# Patient Record
Sex: FEMALE | Race: WHITE | NOT HISPANIC OR LATINO | Employment: OTHER | URBAN - METROPOLITAN AREA
[De-identification: names, ages, dates, MRNs, and addresses within clinical notes are randomized per-mention and may not be internally consistent; named-entity substitution may affect disease eponyms.]

---

## 2018-11-29 ENCOUNTER — IMPORTED ENCOUNTER (OUTPATIENT)
Dept: URBAN - METROPOLITAN AREA CLINIC 43 | Facility: CLINIC | Age: 83
End: 2018-11-29

## 2018-11-29 PROBLEM — H16.223: Noted: 2018-11-29

## 2018-11-29 PROBLEM — H25.12: Noted: 2018-11-29

## 2018-11-29 PROBLEM — H43.813: Noted: 2018-11-29

## 2018-11-29 PROBLEM — Z96.1: Noted: 2018-11-29

## 2018-11-29 PROBLEM — H35.033: Noted: 2018-11-29

## 2018-11-29 PROBLEM — H40.012: Noted: 2018-11-29

## 2018-11-29 PROBLEM — H25.012: Noted: 2018-11-29

## 2018-12-28 ENCOUNTER — IMPORTED ENCOUNTER (OUTPATIENT)
Dept: URBAN - METROPOLITAN AREA CLINIC 43 | Facility: CLINIC | Age: 83
End: 2018-12-28

## 2018-12-28 PROBLEM — H40.012: Noted: 2018-12-28

## 2018-12-28 PROBLEM — H01.00A: Noted: 2018-12-28

## 2018-12-28 PROBLEM — H01.00B: Noted: 2018-12-28

## 2018-12-28 PROBLEM — H16.223: Noted: 2018-12-28

## 2019-01-03 ENCOUNTER — IMPORTED ENCOUNTER (OUTPATIENT)
Dept: URBAN - METROPOLITAN AREA CLINIC 43 | Facility: CLINIC | Age: 84
End: 2019-01-03

## 2019-02-12 ENCOUNTER — IMPORTED ENCOUNTER (OUTPATIENT)
Dept: URBAN - METROPOLITAN AREA CLINIC 43 | Facility: CLINIC | Age: 84
End: 2019-02-12

## 2019-02-12 PROBLEM — H16.223: Noted: 2019-02-12

## 2019-02-12 PROBLEM — H40.012: Noted: 2019-02-12

## 2019-02-12 PROBLEM — H01.004: Noted: 2019-02-12

## 2019-02-12 PROBLEM — H01.001: Noted: 2019-02-12

## 2019-04-11 ENCOUNTER — IMPORTED ENCOUNTER (OUTPATIENT)
Dept: URBAN - METROPOLITAN AREA CLINIC 43 | Facility: CLINIC | Age: 84
End: 2019-04-11

## 2019-04-11 PROBLEM — H40.013: Noted: 2019-04-11

## 2019-04-11 PROBLEM — H25.012: Noted: 2019-04-11

## 2019-04-11 PROBLEM — H25.12: Noted: 2019-04-11

## 2019-04-11 PROBLEM — H01.00B: Noted: 2019-04-11

## 2019-04-11 PROBLEM — H01.00A: Noted: 2019-04-11

## 2019-04-11 PROBLEM — H16.223: Noted: 2019-04-11

## 2019-10-17 ENCOUNTER — ESTABLISHED PATIENT (OUTPATIENT)
Dept: URBAN - METROPOLITAN AREA CLINIC 32 | Facility: CLINIC | Age: 84
End: 2019-10-17

## 2019-10-17 ENCOUNTER — PREPPED CHART (OUTPATIENT)
Dept: URBAN - METROPOLITAN AREA CLINIC 32 | Facility: CLINIC | Age: 84
End: 2019-10-17

## 2019-10-17 DIAGNOSIS — H40.013: ICD-10-CM

## 2019-10-17 PROCEDURE — 0517F GLAUCOMA PLAN OF CARE DOCD: CPT

## 2019-10-17 PROCEDURE — 92083 EXTENDED VISUAL FIELD XM: CPT

## 2019-10-17 PROCEDURE — G8428 CUR MEDS NOT DOCUMENT: HCPCS

## 2019-10-17 PROCEDURE — G9903 PT SCRN TBCO ID AS NON USER: HCPCS

## 2019-10-17 PROCEDURE — 1036F TOBACCO NON-USER: CPT

## 2019-10-17 PROCEDURE — G8483 FLU IMM NO ADMIN DOC REA: HCPCS

## 2019-10-17 PROCEDURE — 4040F PNEUMOC VAC/ADMIN/RCVD: CPT

## 2019-10-17 PROCEDURE — G9744 PT NOT ELI D/T ACT DIG HTN: HCPCS

## 2019-10-17 PROCEDURE — G8756 NO BP MEASURE DOC: HCPCS

## 2019-10-17 PROCEDURE — 92012 INTRM OPH EXAM EST PATIENT: CPT

## 2019-10-17 ASSESSMENT — TONOMETRY
OD_IOP_MMHG: 14
OD_IOP_MMHG: 12
OS_IOP_MMHG: 14
OS_IOP_MMHG: 15

## 2019-10-17 ASSESSMENT — VISUAL ACUITY
OD_SC: 20/20
OD_SC: 20/20-2
OS_SC: 20/25+1
OS_SC: 20/20-2

## 2019-11-25 NOTE — PATIENT DISCUSSION
Despite some risk factors, the patient does not demonstrate definitive evidence of glaucoma at this time.  baseline photo today get VF and RNFL OCT soon to further analyze.

## 2020-03-01 ASSESSMENT — VISUAL ACUITY
OD_SC: 20/20
OS_SC: 20/25-2
OS_CC: J1
OS_SC: 20/25 +2
OD_SC: 20/20
OS_SC: 20/20-1
OS_SC: 20/20
OS_SC: 20/25 +1
OD_CC: J1
OD_SC: 20/20-1
OD_SC: 20/20 -2

## 2020-03-01 ASSESSMENT — KERATOMETRY
OS_K2POWER_DIOPTERS: 42.5
OS_AXISANGLE2_DEGREES: 165
OS_K1POWER_DIOPTERS: 43
OD_AXISANGLE2_DEGREES: 180
OD_K1POWER_DIOPTERS: 42.5
OD_AXISANGLE_DEGREES: 90
OS_AXISANGLE_DEGREES: 75
OD_K2POWER_DIOPTERS: 42

## 2020-03-01 ASSESSMENT — TONOMETRY
OD_IOP_MMHG: 12.0
OS_IOP_MMHG: 14.0
OS_IOP_MMHG: 17.0
OD_IOP_MMHG: 12.0
OD_IOP_MMHG: 13.0
OD_IOP_MMHG: 16.0
OS_IOP_MMHG: 15.0
OS_IOP_MMHG: 15.0

## 2020-07-02 ENCOUNTER — IOP CHECK (OUTPATIENT)
Dept: URBAN - METROPOLITAN AREA CLINIC 32 | Facility: CLINIC | Age: 85
End: 2020-07-02

## 2020-07-02 DIAGNOSIS — H40.013: ICD-10-CM

## 2020-07-02 PROCEDURE — 92133 CPTRZD OPH DX IMG PST SGM ON: CPT

## 2020-07-02 PROCEDURE — 92012 INTRM OPH EXAM EST PATIENT: CPT

## 2020-07-02 ASSESSMENT — VISUAL ACUITY
OS_SC: 20/20-2
OD_CC: J1
OD_SC: 20/20
OS_CC: J1

## 2020-07-02 ASSESSMENT — TONOMETRY
OD_IOP_MMHG: 12
OS_IOP_MMHG: 13

## 2021-01-05 ENCOUNTER — ESTABLISHED COMPREHENSIVE EXAM (OUTPATIENT)
Dept: URBAN - METROPOLITAN AREA CLINIC 32 | Facility: CLINIC | Age: 86
End: 2021-01-05

## 2021-01-05 DIAGNOSIS — H40.013: ICD-10-CM

## 2021-01-05 PROCEDURE — 92014 COMPRE OPH EXAM EST PT 1/>: CPT

## 2021-01-05 PROCEDURE — 92133 CPTRZD OPH DX IMG PST SGM ON: CPT

## 2021-01-05 ASSESSMENT — TONOMETRY
OS_IOP_MMHG: 13
OD_IOP_MMHG: 12

## 2021-01-05 ASSESSMENT — VISUAL ACUITY
OD_CC: J1+
OS_SC: 20/20-2
OS_CC: J1+
OD_SC: 20/20
OU_SC: 20/20
OD_SC: J3
OS_SC: J4

## 2021-05-06 ENCOUNTER — IOP CHECK (OUTPATIENT)
Dept: URBAN - METROPOLITAN AREA CLINIC 32 | Facility: CLINIC | Age: 86
End: 2021-05-06

## 2021-05-06 DIAGNOSIS — H40.1121: ICD-10-CM

## 2021-05-06 DIAGNOSIS — H40.011: ICD-10-CM

## 2021-05-06 PROCEDURE — 92012 INTRM OPH EXAM EST PATIENT: CPT

## 2021-05-06 PROCEDURE — 92133 CPTRZD OPH DX IMG PST SGM ON: CPT

## 2021-05-06 ASSESSMENT — VISUAL ACUITY
OD_SC: 20/20-2
OS_SC: 20/20-3

## 2021-05-06 ASSESSMENT — TONOMETRY
OS_IOP_MMHG: 14
OD_IOP_MMHG: 12

## 2021-09-13 ENCOUNTER — IOP CHECK (OUTPATIENT)
Dept: URBAN - METROPOLITAN AREA CLINIC 32 | Facility: CLINIC | Age: 86
End: 2021-09-13

## 2021-09-13 DIAGNOSIS — H40.1121: ICD-10-CM

## 2021-09-13 DIAGNOSIS — H40.011: ICD-10-CM

## 2021-09-13 DIAGNOSIS — D23.121: ICD-10-CM

## 2021-09-13 PROCEDURE — 92012 INTRM OPH EXAM EST PATIENT: CPT

## 2021-09-13 PROCEDURE — 92083 EXTENDED VISUAL FIELD XM: CPT

## 2021-09-13 ASSESSMENT — TONOMETRY
OD_IOP_MMHG: 10
OS_IOP_MMHG: 12

## 2021-09-13 ASSESSMENT — VISUAL ACUITY
OS_SC: 20/25
OD_SC: 20/25-2

## 2022-03-15 ENCOUNTER — COMPREHENSIVE EXAM (OUTPATIENT)
Dept: URBAN - METROPOLITAN AREA CLINIC 32 | Facility: CLINIC | Age: 87
End: 2022-03-15

## 2022-03-15 DIAGNOSIS — H40.1121: ICD-10-CM

## 2022-03-15 DIAGNOSIS — H40.011: ICD-10-CM

## 2022-03-15 PROCEDURE — 99214 OFFICE O/P EST MOD 30 MIN: CPT

## 2022-03-15 PROCEDURE — 92015 DETERMINE REFRACTIVE STATE: CPT

## 2022-03-15 PROCEDURE — 92133 CPTRZD OPH DX IMG PST SGM ON: CPT

## 2022-03-15 ASSESSMENT — VISUAL ACUITY
OS_CC: J2
OS_SC: 20/30
OD_CC: J2
OD_SC: 20/20-2

## 2022-03-15 ASSESSMENT — TONOMETRY
OD_IOP_MMHG: 13
OS_IOP_MMHG: 14

## 2022-06-04 ENCOUNTER — TELEPHONE ENCOUNTER (OUTPATIENT)
Dept: URBAN - METROPOLITAN AREA CLINIC 68 | Facility: CLINIC | Age: 87
End: 2022-06-04

## 2022-06-05 ENCOUNTER — TELEPHONE ENCOUNTER (OUTPATIENT)
Dept: URBAN - METROPOLITAN AREA CLINIC 68 | Facility: CLINIC | Age: 87
End: 2022-06-05

## 2022-06-25 ENCOUNTER — TELEPHONE ENCOUNTER (OUTPATIENT)
Age: 87
End: 2022-06-25

## 2022-06-26 ENCOUNTER — TELEPHONE ENCOUNTER (OUTPATIENT)
Age: 87
End: 2022-06-26

## 2024-04-05 ENCOUNTER — COMPREHENSIVE EXAM (OUTPATIENT)
Dept: URBAN - METROPOLITAN AREA CLINIC 32 | Facility: CLINIC | Age: 89
End: 2024-04-05

## 2024-04-05 DIAGNOSIS — H43.813: ICD-10-CM

## 2024-04-05 DIAGNOSIS — H40.011: ICD-10-CM

## 2024-04-05 DIAGNOSIS — H40.1121: ICD-10-CM

## 2024-04-05 DIAGNOSIS — D23.121: ICD-10-CM

## 2024-04-05 DIAGNOSIS — H01.021: ICD-10-CM

## 2024-04-05 DIAGNOSIS — H01.024: ICD-10-CM

## 2024-04-05 DIAGNOSIS — H35.033: ICD-10-CM

## 2024-04-05 DIAGNOSIS — H25.12: ICD-10-CM

## 2024-04-05 DIAGNOSIS — H16.223: ICD-10-CM

## 2024-04-05 PROCEDURE — 99214 OFFICE O/P EST MOD 30 MIN: CPT

## 2024-04-05 PROCEDURE — 92133 CPTRZD OPH DX IMG PST SGM ON: CPT

## 2024-04-05 ASSESSMENT — KERATOMETRY
OD_K2POWER_DIOPTERS: 41.75
OS_AXISANGLE2_DEGREES: 85
OS_AXISANGLE_DEGREES: 175
OS_K1POWER_DIOPTERS: 43.00
OD_AXISANGLE2_DEGREES: 95
OS_K2POWER_DIOPTERS: 41.25
OD_K1POWER_DIOPTERS: 43.00
OD_AXISANGLE_DEGREES: 5

## 2024-04-05 ASSESSMENT — TONOMETRY
OS_IOP_MMHG: 14
OD_IOP_MMHG: 11

## 2024-04-05 ASSESSMENT — VISUAL ACUITY
OD_CC: J1+/-1
OS_CC: J1
OD_SC: J2-2
OS_SC: 20/30-2
OD_SC: 20/25+3
OS_SC: J5-1

## 2024-07-15 ENCOUNTER — EMERGENCY VISIT (OUTPATIENT)
Dept: URBAN - METROPOLITAN AREA CLINIC 32 | Facility: CLINIC | Age: 89
End: 2024-07-15

## 2024-07-15 DIAGNOSIS — G43.B0: ICD-10-CM

## 2024-07-15 PROCEDURE — 99213 OFFICE O/P EST LOW 20 MIN: CPT

## 2024-07-15 ASSESSMENT — VISUAL ACUITY
OD_SC: 20/30
OS_SC: 20/30

## 2024-07-15 ASSESSMENT — TONOMETRY
OD_IOP_MMHG: 16
OS_IOP_MMHG: 14

## 2024-10-14 ENCOUNTER — FOLLOW UP (OUTPATIENT)
Dept: URBAN - METROPOLITAN AREA CLINIC 32 | Facility: CLINIC | Age: 89
End: 2024-10-14

## 2024-10-14 DIAGNOSIS — H40.011: ICD-10-CM

## 2024-10-14 DIAGNOSIS — H40.1121: ICD-10-CM

## 2024-10-14 PROCEDURE — 92083 EXTENDED VISUAL FIELD XM: CPT

## 2024-10-14 PROCEDURE — 99213 OFFICE O/P EST LOW 20 MIN: CPT

## 2025-04-15 ENCOUNTER — COMPREHENSIVE EXAM (OUTPATIENT)
Age: 89
End: 2025-04-15

## 2025-04-15 DIAGNOSIS — H25.12: ICD-10-CM

## 2025-04-15 DIAGNOSIS — H40.011: ICD-10-CM

## 2025-04-15 DIAGNOSIS — H16.223: ICD-10-CM

## 2025-04-15 DIAGNOSIS — H40.1121: ICD-10-CM

## 2025-04-15 PROCEDURE — 92133 CPTRZD OPH DX IMG PST SGM ON: CPT

## 2025-04-15 PROCEDURE — 99214 OFFICE O/P EST MOD 30 MIN: CPT
